# Patient Record
Sex: FEMALE | Race: OTHER | Employment: OTHER | URBAN - METROPOLITAN AREA
[De-identification: names, ages, dates, MRNs, and addresses within clinical notes are randomized per-mention and may not be internally consistent; named-entity substitution may affect disease eponyms.]

---

## 2019-01-29 ENCOUNTER — OFFICE VISIT (OUTPATIENT)
Dept: FAMILY MEDICINE CLINIC | Age: 39
End: 2019-01-29

## 2019-01-29 ENCOUNTER — HOSPITAL ENCOUNTER (OUTPATIENT)
Dept: LAB | Age: 39
Discharge: HOME | End: 2019-01-29

## 2019-01-29 VITALS
BODY MASS INDEX: 31.72 KG/M2 | DIASTOLIC BLOOD PRESSURE: 74 MMHG | TEMPERATURE: 98.6 F | WEIGHT: 168 LBS | SYSTOLIC BLOOD PRESSURE: 115 MMHG | HEIGHT: 61 IN | HEART RATE: 92 BPM

## 2019-01-29 DIAGNOSIS — G44.219 EPISODIC TENSION-TYPE HEADACHE, NOT INTRACTABLE: Primary | ICD-10-CM

## 2019-01-29 DIAGNOSIS — M79.89 BILATERAL HAND SWELLING: ICD-10-CM

## 2019-01-29 DIAGNOSIS — Z23 ENCOUNTER FOR IMMUNIZATION: ICD-10-CM

## 2019-01-29 DIAGNOSIS — R53.83 FATIGUE, UNSPECIFIED TYPE: ICD-10-CM

## 2019-01-29 DIAGNOSIS — M25.50 ARTHRALGIA, UNSPECIFIED JOINT: ICD-10-CM

## 2019-01-29 PROCEDURE — 85652 RBC SED RATE AUTOMATED: CPT

## 2019-01-29 PROCEDURE — 84439 ASSAY OF FREE THYROXINE: CPT

## 2019-01-29 PROCEDURE — 83036 HEMOGLOBIN GLYCOSYLATED A1C: CPT

## 2019-01-29 PROCEDURE — 84480 ASSAY TRIIODOTHYRONINE (T3): CPT

## 2019-01-29 PROCEDURE — 80053 COMPREHEN METABOLIC PANEL: CPT

## 2019-01-29 PROCEDURE — 84443 ASSAY THYROID STIM HORMONE: CPT

## 2019-01-29 PROCEDURE — 85025 COMPLETE CBC W/AUTO DIFF WBC: CPT

## 2019-01-29 NOTE — PROGRESS NOTES
Qing Gary  Requests flu vaccine. Denies fever and egg allergy. VIS information sheet given to patient. Explained possible s/e. Reviewed s/sx indicating need to be seen in ER. Patient had no adverse reaction at time of discharge. Entered into VIIS. GIVEN BY Enma MARTINEZ LPN.  Terrell Mercedes RN

## 2019-01-29 NOTE — PROGRESS NOTES
Hailey Blankenship is a 45 y.o. female    Issues discussed today include:    1) Headaches, eyes watering:    New daily headaches for 2-3 weeks now. Never before had headaches. No focal neurologic sxs, but can feel dizzy when severe. Eyes water off and on. Reports not sleeping well. Drinking lots of water. Also with multiple joint pains for > 10 yrs. Was dx'd with juvenile arthritis in [place] when she was about 31 yo. Went through tx then and got better. Now starting to having body and joint aches again. Swelling in the face occurs more in the morning, then improves throughout the day - only has had this 3 times in the last few weeks. Swelling in hands occurs in the afternoons and evenings, most days. Thinks her mat gpa had arthritis, unsure if rheumatoid or osteo. Sister with severe anemia, Hgb 6.0 and needed transfusions, remains in tx. Denies fever, chills, nasal congestion. Data reviewed or ordered today:       Other problems include: There is no problem list on file for this patient. Medications:  No current outpatient medications on file prior to visit. No current facility-administered medications on file prior to visit. Allergies:  No Known Allergies    LMP:  No LMP recorded. Patient has had a hysterectomy.     Social History     Socioeconomic History    Marital status: SINGLE     Spouse name: Not on file    Number of children: Not on file    Years of education: Not on file    Highest education level: Not on file   Social Needs    Financial resource strain: Not on file    Food insecurity - worry: Not on file    Food insecurity - inability: Not on file    Transportation needs - medical: Not on file   [employer] needs - non-medical: Not on file   Occupational History    Not on file   Tobacco Use    Smoking status: Never Smoker    Smokeless tobacco: Never Used   Substance and Sexual Activity    Alcohol use: No     Frequency: Never    Drug use: No    Sexual activity: Not on file   Other Topics Concern    Not on file   Social History Narrative    Not on file       No family history on file. Physical Exam   Visit Vitals  /74 (BP 1 Location: Left arm, BP Patient Position: Sitting)   Pulse 92   Temp 98.6 °F (37 °C) (Oral)   Ht 5' 0.63\" (1.54 m)   Wt 168 lb (76.2 kg)   BMI 32.13 kg/m²      BP Readings from Last 3 Encounters:   01/29/19 115/74     Constitutional: Appears well,  No acute distress, Vitals noted  Psychiatric:  Affect normal, Alert and Oriented to person/place/time  Eyes:  Conjunctiva clear, no drainage  ENT:  External ears and nose normal, Mucous membranes moist  Neck:  General inspection normal. Supple. Heart:  Normal HR, Normal S1 and S2,  Regular rhythm. No murmurs, rubs or gallops. Lungs:  Clear to auscultation, good respiratory effort, no wheezes, rales or rhonchi  Extremities: Without edema, good peripheral pulses  Skin:  Warm to palpation, without rashes  MSK: no joint efusion or tenderness on exam today, FROM [facility]  Neuro: CNII-XII intact, symmetric and intact sensation to light touch throughout, equal and full motor strength in all extremities, DTRs symmetric and normal      Assessment/Plan:      ICD-10-CM ICD-9-CM    1. Episodic tension-type headache, not intractable G44.219 339.11 CBC WITH AUTOMATED DIFF      METABOLIC PANEL, COMPREHENSIVE      HEMOGLOBIN A1C WITH EAG      SED RATE (ESR)      SED RATE (ESR)      HEMOGLOBIN A1C WITH EAG      METABOLIC PANEL, COMPREHENSIVE      CBC WITH AUTOMATED DIFF   2. Arthralgia, unspecified joint M25.50 719.40 CBC WITH AUTOMATED DIFF      SED RATE (ESR)      SED RATE (ESR)      CBC WITH AUTOMATED DIFF   3. Fatigue, unspecified type R53.83 780.79 TSH 3RD GENERATION      TSH 3RD GENERATION   4.  Bilateral hand swelling M79.89 729.81 TSH 3RD GENERATION      TSH 3RD GENERATION       Labs today to r/o etiology of her arthralgias, fatigue, headaches  Labs today - will call if abnormal or she can call in a week to know results  Nurse to give optometry resource page for eye exam  AVS with handout on tension headaches  Close follow up, will make sooner intervention if abnl labs      Follow-up Disposition:  Return in about 6 weeks (around 3/12/2019) for f/u appt.         Maia Bass MD  [address] [place] [address]

## 2019-01-29 NOTE — PATIENT INSTRUCTIONS
Dolor musculoesquelético: Instrucciones de cuidado - [ Musculoskeletal Pain: Care Instructions ]  Instrucciones de cuidado    Diferentes problemas con los [place], los músculos, los nervios, los ligamentos y los tendones del cuerpo pueden provocar dolor. Es posible que le duelan o ardan roger o más zonas del cuerpo. O pueden sentirse cansadas o rígidas. El término médico para alma tipo de dolor es dolor musculoesquelético. Puede tener muchas causas diferentes. A veces, el dolor está causado por roger lesión sneha roger distensión o un esguince. O usted puede tener dolor por juan r usado roger parte de blackwood cuerpo de la misma manera roger y [place]. Takilma se llama uso excesivo. En algunos casos, la causa del dolor es otro problema sneha la artritis o la fibromialgia. El médico lo Tiana Aliza y le hará preguntas acerca de blackwood philippe para ayudar a determinar la causa del dolor. Los análisis de [address] o las pruebas por imágenes, sneha roger radiografía (jossue X), también pueden ser [place]. Sin embargo, los médicos a veces no encuentran la causa del dolor. El tratamiento depende de maddi síntomas y de la causa del dolor, si es que se conoce. El médico lo morgan examinado minuciosamente, gwendolyn pueden presentarse problemas más tarde. Si nota algún problema o nuevos síntomas, busque tratamiento médico de inmediato. La atención de seguimiento es roger parte clave de blackwood tratamiento y seguridad. Asegúrese de hacer y acudir a todas las citas, y llame a blackwood médico si está teniendo problemas. También es roger buena idea saber los resultados de maddi exámenes y mantener roger lista de los medicamentos que belkis. ¿Cómo puede cuidarse en el hogar? · Descanse hasta que se sienta mejor. · No jose nada que empeore el dolor. Retome el ejercicio gradualmente si se siente mejor y blackwood médico dice que [address] [facility]. · Sea milly con los medicamentos. Paula y siga todas las instrucciones de la [place].   ? Si el médico le recetó un analgésico (medicamento para el dolor), tómelo según las indicaciones. ? Si no está tomando un analgésico recetado, pregúntele a blackwood médico si puede sahara kimmy de [employer]. · Colóquese hielo o roger compresa fría en la tyree por entre 10 y 21 minutos cada vez para aliviar el dolor. Póngase un paño fischer entre el hielo y la piel. ¿Cuándo debe pedir ayuda? Llame a blackwood médico ahora mismo o busque atención médica inmediata si:    · Siente un dolor nuevo o el dolor empeora.     · Tiene nuevos síntomas, sneha fiebre, escalofríos o un salpullido.    Preste especial atención a los cambios en blackwood philippe y asegúrese de comunicarse con blackwood médico si:    · No mejora sneha se esperaba. ¿Dónde puede encontrar más información en inglés? Jenise Pérez a http://peter-miguel.info/. Mabel Bray M974 en la búsqueda para aprender más acerca de \"Dolor musculoesquelético: Instrucciones de cuidado - [ Musculoskeletal Pain: Care Instructions ]. \"  Revisado: 3 noé, 2018  Versión del contenido: 11.9  © 3493-8385 Healthwise, Incorporated. Las instrucciones de cuidado fueron adaptadas bajo licencia por [employer] (which disclaims liability or warranty for this information). Si usted tiene [place] afección médica o sobre estas instrucciones, siempre pregunte a blackwood profesional de philippe. Healthwise, Incorporated niega toda garantía o responsabilidad por blackwood uso de esta información. Dolor de arnie: Instrucciones de cuidado - [ Headache: Care Instructions ]  Instrucciones de cuidado    Los ana de arnie tienen muchas causas posibles. La mayoría de los ana de arnie no son señal de un problema más cruz y mejoran por sí solos. El tratamiento en el hogar podría ayudarlo a sentirse mejor con Clementina Smiani. El médico lo morgan revisado minuciosamente, gwendolyn puede desarrollar problemas más tarde. Si nota algún problema o síntomas, busque tratamiento médico inmediatamente.   Gian Furl de seguimiento es roger parte clave de blackwood tratamiento y seguridad. Asegúrese de hacer y acudir a todas las citas, y llame a blackwood médico si está teniendo problemas. También es roger buena idea saber los resultados de maddi exámenes y mantener roger lista de los medicamentos que belkis. ¿Cómo puede cuidarse en el hogar? · No conduzca si ha tomado analgésicos (medicamentos para el dolor) recetados. · Descanse en un cuarto tranquilo y oscuro hasta que desaparezca el dolor de [place]. Cierre los ojos y trate de relajarse o dormirse. No johnson la televisión ni umm. · Colóquese un paño frío y húmedo o [place] compresa fría sobre la tyree adolorida de 10 a 21 minutos cada vez. Póngase un paño fischer entre la compresa fría y la piel. · Utilice roger toalla húmeda tibia o roger almohadilla térmica ajustada a [place] temperatura para relajar los músculos tensos del sharyn y los hombros.  · [place] a alguien que le jose masajes suaves en el sharyn y los hombros.  · [place] los analgésicos exactamente sneha le fueron indicados. ? Si el médico le recetó un analgésico, tómelo según las indicaciones. ? Si no está tomando un analgésico recetado, pregúntele a blackwood médico si puede sahara kimmy de [employer]. · Tenga cuidado de no sahara analgésicos con mayor frecuencia que la permitida en las indicaciones porque los ana de arnie podrían empeorar o aparecer con mayor frecuencia roger vez que el medicamento pierda blackwood [place]. · Preste atención a los nuevos síntomas que aparecen con el dolor de [place], [place], debilidad o entumecimiento, cambios en la visión o confusión. Podrían ser señales de un problema más grave. Para prevenir los ana de arnie  · [employer] un diario de maddi ana de arnie para que pueda averiguar qué los desencadena. Evitar los desencadenantes podría ayudar a prevenir los ana de [place]. Anote cuándo empieza cada dolor de [place], cuánto dura y cómo es el dolor (palpitante, debbie, punzante o sordo).  Anote cualquier otro síntoma que haya tenido con el dolor de [place], [place], destellos de jake o [employer], o sensibilidad a la jake brillante o a los ruidos ciro. Anote si el dolor de arnie ocurrió cerca de blackwood menstruación. Enumere todos los factores que pudieran juan r desencadenado el dolor de [place], sneha ciertos alimentos (chocolate, queso, vino) u olores, humo, luces brillantes, estrés o falta de sueño. · Encuentre maneras saludables de [facility]. Los ana de [place] son más comunes titi o jese después de un momento estresante. Tómese un tiempo para relajarse antes y después de hacer algo que le haya causado un dolor de arnie en el pasado. · Trate de mantener dori músculos relajados mediante roger buena postura. Revise si tiene [employer] de la [place], la dana, el sharyn y los hombros y trate de relajarlos. Cuando se siente en un escritorio, cambie de posición con frecuencia y estírese por 27 segundos cada hora. · Gwen suficiente ejercicio y duerma bastante. · Coma en forma regular y abimael. Largos períodos sin comer pueden provocar un dolor de arnie. · Regálese un masaje. Algunas personas encuentran que los masajes hechos con regularidad son [place] para aliviar la tensión. · Limite la cafeína. No claudio demasiado café, té ni sodas. Jairon no deje de consumir cafeína de repente, porque eso también puede provocarle ana de [place]. · Reduzca la tensión en los ojos a causa de la computadora parpadeando con frecuencia y apartando la mirada de la pantalla a menudo. Asegúrese de tener lentes adecuados y de que blackwood monitor esté colocado de manera correcta, sneha a un brazo de distancia. · Busque ayuda si tiene depresión o ansiedad. Dori ana de [place] podrían relacionarse con estas afecciones. El tratamiento puede prevenir los ana de [place] y ayudar con los síntomas de ansiedad o depresión. ¿Cuándo debe pedir ayuda? Llame al 911 en cualquier momento que piense que puede necesitar atención de emergencia.  Por ejemplo, llame si:    · Yana Clements señales de un ataque cerebral. Estas pueden incluir:  ? Parálisis, entumecimiento o debilidad repentinos en la dana, el brazo o la pierna, sobre todo si ocurre en un solo lado del cuerpo. ? Cambios repentinos en la visión. ? Dificultades repentinas para hablar. ? Confusión repentina o dificultad para comprender frases sencillas. ? Problemas repentinos para caminar o mantener el equilibrio. ? Dolor de [place] intenso y repentino, distinto de los ana de arnie anteriores.    Llame a blackwood médico ahora mismo o busque atención médica inmediata si:    · Tiene un dolor de Yamel Bryan o peor.     · Blackwood dolor de arnie empeora mucho. ¿Dónde puede encontrar más información en inglés? Ashtyn Andrade a http://peter-miguel.info/. Escriba W576 en la búsqueda para aprender más acerca de \"Dolor de arnie: Instrucciones de cuidado - [ Headache: Care Instructions ]. \"  Revisado: 3 noé, 2018  Versión del contenido: 11.9  © 6374-7290 Healthwise, Incorporated. Las instrucciones de cuidado fueron adaptadas bajo licencia por [employer] (which disclaims liability or warranty for this information). Si usted tiene [place] afección médica o sobre estas instrucciones, siempre pregunte a blackwood profesional de philippe. Healthwise, Incorporated niega toda garantía o responsabilidad por blackwood uso de esta información.

## 2019-01-30 LAB
ALBUMIN SERPL-MCNC: 4.3 G/DL (ref 3.5–5)
ALBUMIN/GLOB SERPL: 1.1 {RATIO} (ref 1.1–2.2)
ALP SERPL-CCNC: 87 U/L (ref 45–117)
ALT SERPL-CCNC: 25 U/L (ref 12–78)
ANION GAP SERPL CALC-SCNC: 8 MMOL/L (ref 5–15)
AST SERPL-CCNC: 28 U/L (ref 15–37)
BASOPHILS # BLD: 0.1 K/UL (ref 0–0.1)
BASOPHILS NFR BLD: 1 % (ref 0–1)
BILIRUB SERPL-MCNC: 0.4 MG/DL (ref 0.2–1)
BUN SERPL-MCNC: 10 MG/DL (ref 6–20)
BUN/CREAT SERPL: 9 (ref 12–20)
CALCIUM SERPL-MCNC: 8.8 MG/DL (ref 8.5–10.1)
CHLORIDE SERPL-SCNC: 101 MMOL/L (ref 97–108)
CO2 SERPL-SCNC: 29 MMOL/L (ref 21–32)
CREAT SERPL-MCNC: 1.09 MG/DL (ref 0.55–1.02)
DIFFERENTIAL METHOD BLD: ABNORMAL
EOSINOPHIL # BLD: 0.2 K/UL (ref 0–0.4)
EOSINOPHIL NFR BLD: 2 % (ref 0–7)
ERYTHROCYTE [DISTWIDTH] IN BLOOD BY AUTOMATED COUNT: 13.9 % (ref 11.5–14.5)
ERYTHROCYTE [SEDIMENTATION RATE] IN BLOOD: 18 MM/HR (ref 0–20)
EST. AVERAGE GLUCOSE BLD GHB EST-MCNC: 111 MG/DL
GLOBULIN SER CALC-MCNC: 4 G/DL (ref 2–4)
GLUCOSE SERPL-MCNC: 83 MG/DL (ref 65–100)
HBA1C MFR BLD: 5.5 % (ref 4.2–6.3)
HCT VFR BLD AUTO: 38.4 % (ref 35–47)
HGB BLD-MCNC: 12.1 G/DL (ref 11.5–16)
IMM GRANULOCYTES # BLD AUTO: 0 K/UL (ref 0–0.04)
IMM GRANULOCYTES NFR BLD AUTO: 0 % (ref 0–0.5)
LYMPHOCYTES # BLD: 2.6 K/UL (ref 0.8–3.5)
LYMPHOCYTES NFR BLD: 36 % (ref 12–49)
MCH RBC QN AUTO: 29.5 PG (ref 26–34)
MCHC RBC AUTO-ENTMCNC: 31.5 G/DL (ref 30–36.5)
MCV RBC AUTO: 93.7 FL (ref 80–99)
MONOCYTES # BLD: 0.3 K/UL (ref 0–1)
MONOCYTES NFR BLD: 4 % (ref 5–13)
NEUTS SEG # BLD: 4.2 K/UL (ref 1.8–8)
NEUTS SEG NFR BLD: 57 % (ref 32–75)
NRBC # BLD: 0 K/UL (ref 0–0.01)
NRBC BLD-RTO: 0 PER 100 WBC
PLATELET # BLD AUTO: 250 K/UL (ref 150–400)
PMV BLD AUTO: 11.7 FL (ref 8.9–12.9)
POTASSIUM SERPL-SCNC: 4.8 MMOL/L (ref 3.5–5.1)
PROT SERPL-MCNC: 8.3 G/DL (ref 6.4–8.2)
RBC # BLD AUTO: 4.1 M/UL (ref 3.8–5.2)
SODIUM SERPL-SCNC: 138 MMOL/L (ref 136–145)
T4 FREE SERPL-MCNC: 0.3 NG/DL (ref 0.8–1.5)
TSH SERPL DL<=0.05 MIU/L-ACNC: >100 UIU/ML (ref 0.36–3.74)
WBC # BLD AUTO: 7.4 K/UL (ref 3.6–11)

## 2019-01-30 NOTE — PROGRESS NOTES
TSH extremely high c/w hypothyroidism, which could explain her sxs   ** I will be recommending tx, but I have requested add on labs of free T4 and total T3 be run also    CMP with only mild Cr elevation, will monitor with thyroid studies     CBC, ESR and A1c wnl

## 2019-01-31 DIAGNOSIS — E03.9 HYPOTHYROIDISM, UNSPECIFIED TYPE: Primary | ICD-10-CM

## 2019-01-31 LAB — T3 SERPL-MCNC: 69 NG/DL (ref 71–180)

## 2019-01-31 RX ORDER — LEVOTHYROXINE SODIUM 100 UG/1
100 TABLET ORAL
Qty: 90 TAB | Refills: 1 | Status: SHIPPED | OUTPATIENT
Start: 2019-01-31

## 2019-01-31 NOTE — PROGRESS NOTES
Patient with new dx of hypothyroidism, TSH > 100 and low fT4 and tT3  - Start levothyroxine 100 mcg daily 30 mins before breakfast and not with any other meds (rx sent to [facility] - $24 with goodrx coupon needed; could send to [facility] of pt's choice for $10 without coupon necessary)  - Recommend recheck TSH in 6 weeks (lab ordered)    Other labs (CBC, CMP, ESR, A1c essentially normal)

## 2019-02-01 NOTE — PROGRESS NOTES
I called the pt, no answer - left vm asking her to call us back at [facility] to discuss results and tx

## 2019-02-04 NOTE — PROGRESS NOTES
I attempted to call pt again and no answer, left  requesting she call us back soon to discuss results, provided main # to [facility]

## 2019-02-08 NOTE — PROGRESS NOTES
I called emergency contact #, but was not the person listed and denied knowing emergency contact's name.

## 2019-02-22 NOTE — PROGRESS NOTES
Telephoned patient to home/cell, no answer; left generic message to call our [facility] main office. Nurse also telephoned emergency contact no answer left message to reach our office back, a letter was also created and send to address on file to visit one of our clinic of call our office to go over lab results.  Maria Doshi RN

## 2019-02-22 NOTE — PROGRESS NOTES
Appreciate the efforts to reach this pt with important result  I too tried calling her once more on mobile and left vm expressing how important it was for her to call our office (# given) to discuss her results  Hopefully she will contact us soon with multiple phone messages and letter mailed today

## 2020-02-18 ENCOUNTER — HOSPITAL ENCOUNTER (OUTPATIENT)
Dept: LAB | Age: 40
Discharge: HOME | End: 2020-02-18

## 2020-02-18 LAB
CHOLEST SERPL-MCNC: 207 MG/DL
HDLC SERPL-MCNC: 41 MG/DL
HDLC SERPL: 5 {RATIO} (ref 0–5)
LDLC SERPL CALC-MCNC: 110.6 MG/DL (ref 0–100)
LIPID PROFILE,FLP: ABNORMAL
TRIGL SERPL-MCNC: 277 MG/DL (ref ?–150)
VLDLC SERPL CALC-MCNC: 55.4 MG/DL

## 2020-02-18 PROCEDURE — 80061 LIPID PANEL: CPT

## 2023-05-03 ENCOUNTER — HOSPITAL ENCOUNTER (OUTPATIENT)
Dept: LAB | Age: 43
Discharge: HOME | End: 2023-05-03

## 2023-05-03 PROCEDURE — 36415 COLL VENOUS BLD VENIPUNCTURE: CPT

## 2023-05-03 PROCEDURE — 85025 COMPLETE CBC W/AUTO DIFF WBC: CPT

## 2023-05-03 PROCEDURE — 84443 ASSAY THYROID STIM HORMONE: CPT

## 2023-05-03 PROCEDURE — 80061 LIPID PANEL: CPT

## 2023-05-03 PROCEDURE — 80053 COMPREHEN METABOLIC PANEL: CPT

## 2023-05-03 PROCEDURE — 83036 HEMOGLOBIN GLYCOSYLATED A1C: CPT

## 2023-05-04 LAB
ALBUMIN SERPL-MCNC: 4.1 G/DL (ref 3.5–5)
ALBUMIN/GLOB SERPL: 1.1 (ref 1.1–2.2)
ALP SERPL-CCNC: 81 U/L (ref 45–117)
ALT SERPL-CCNC: 22 U/L (ref 12–78)
ANION GAP SERPL CALC-SCNC: 2 MMOL/L (ref 5–15)
AST SERPL-CCNC: 18 U/L (ref 15–37)
BASOPHILS # BLD: 0.1 K/UL (ref 0–0.1)
BASOPHILS NFR BLD: 1 % (ref 0–1)
BILIRUB SERPL-MCNC: 0.2 MG/DL (ref 0.2–1)
BUN SERPL-MCNC: 13 MG/DL (ref 6–20)
BUN/CREAT SERPL: 18 (ref 12–20)
CALCIUM SERPL-MCNC: 9.2 MG/DL (ref 8.5–10.1)
CHLORIDE SERPL-SCNC: 103 MMOL/L (ref 97–108)
CHOLEST SERPL-MCNC: 207 MG/DL
CO2 SERPL-SCNC: 28 MMOL/L (ref 21–32)
CREAT SERPL-MCNC: 0.73 MG/DL (ref 0.55–1.02)
DIFFERENTIAL METHOD BLD: ABNORMAL
EOSINOPHIL # BLD: 0.1 K/UL (ref 0–0.4)
EOSINOPHIL NFR BLD: 1 % (ref 0–7)
ERYTHROCYTE [DISTWIDTH] IN BLOOD BY AUTOMATED COUNT: 13.6 % (ref 11.5–14.5)
EST. AVERAGE GLUCOSE BLD GHB EST-MCNC: 114 MG/DL
GLOBULIN SER CALC-MCNC: 3.6 G/DL (ref 2–4)
GLUCOSE SERPL-MCNC: 102 MG/DL (ref 65–100)
HBA1C MFR BLD: 5.6 % (ref 4–5.6)
HCT VFR BLD AUTO: 38.8 % (ref 35–47)
HDLC SERPL-MCNC: 54 MG/DL
HDLC SERPL: 3.8 (ref 0–5)
HGB BLD-MCNC: 12.3 G/DL (ref 11.5–16)
IMM GRANULOCYTES # BLD AUTO: 0.1 K/UL (ref 0–0.04)
IMM GRANULOCYTES NFR BLD AUTO: 1 % (ref 0–0.5)
LDLC SERPL CALC-MCNC: 137 MG/DL (ref 0–100)
LYMPHOCYTES # BLD: 2 K/UL (ref 0.8–3.5)
LYMPHOCYTES NFR BLD: 31 % (ref 12–49)
MCH RBC QN AUTO: 30.4 PG (ref 26–34)
MCHC RBC AUTO-ENTMCNC: 31.7 G/DL (ref 30–36.5)
MCV RBC AUTO: 95.8 FL (ref 80–99)
MONOCYTES # BLD: 0.3 K/UL (ref 0–1)
MONOCYTES NFR BLD: 5 % (ref 5–13)
NEUTS SEG # BLD: 3.8 K/UL (ref 1.8–8)
NEUTS SEG NFR BLD: 61 % (ref 32–75)
NRBC # BLD: 0 K/UL (ref 0–0.01)
NRBC BLD-RTO: 0 PER 100 WBC
PLATELET # BLD AUTO: 246 K/UL (ref 150–400)
PMV BLD AUTO: 11.2 FL (ref 8.9–12.9)
POTASSIUM SERPL-SCNC: 3.9 MMOL/L (ref 3.5–5.1)
PROT SERPL-MCNC: 7.7 G/DL (ref 6.4–8.2)
RBC # BLD AUTO: 4.05 M/UL (ref 3.8–5.2)
SODIUM SERPL-SCNC: 133 MMOL/L (ref 136–145)
TRIGL SERPL-MCNC: 80 MG/DL (ref ?–150)
TSH SERPL DL<=0.05 MIU/L-ACNC: >100 UIU/ML (ref 0.36–3.74)
VLDLC SERPL CALC-MCNC: 16 MG/DL
WBC # BLD AUTO: 6.3 K/UL (ref 3.6–11)

## 2023-05-08 ENCOUNTER — HOSPITAL ENCOUNTER (OUTPATIENT)
Facility: HOSPITAL | Age: 43
Setting detail: SPECIMEN
Discharge: HOME | End: 2023-05-11

## 2023-05-08 LAB
BASOPHILS # BLD: 0.1 K/UL (ref 0–0.1)
BASOPHILS NFR BLD: 1 % (ref 0–1)
DIFFERENTIAL METHOD BLD: ABNORMAL
EOSINOPHIL # BLD: 0.2 K/UL (ref 0–0.4)
EOSINOPHIL NFR BLD: 2 % (ref 0–7)
ERYTHROCYTE [DISTWIDTH] IN BLOOD BY AUTOMATED COUNT: 13 % (ref 11.5–14.5)
HCT VFR BLD AUTO: 37.8 % (ref 35–47)
HGB BLD-MCNC: 12.2 G/DL (ref 11.5–16)
IMM GRANULOCYTES # BLD AUTO: 0.1 K/UL (ref 0–0.04)
IMM GRANULOCYTES NFR BLD AUTO: 1 % (ref 0–0.5)
LYMPHOCYTES # BLD: 3.2 K/UL (ref 0.8–3.5)
LYMPHOCYTES NFR BLD: 29 % (ref 12–49)
MCH RBC QN AUTO: 30.1 PG (ref 26–34)
MCHC RBC AUTO-ENTMCNC: 32.3 G/DL (ref 30–36.5)
MCV RBC AUTO: 93.3 FL (ref 80–99)
MONOCYTES # BLD: 0.5 K/UL (ref 0–1)
MONOCYTES NFR BLD: 5 % (ref 5–13)
NEUTS SEG # BLD: 7 K/UL (ref 1.8–8)
NEUTS SEG NFR BLD: 62 % (ref 32–75)
NRBC # BLD: 0 K/UL (ref 0–0.01)
NRBC BLD-RTO: 0 PER 100 WBC
PLATELET # BLD AUTO: 254 K/UL (ref 150–400)
PMV BLD AUTO: 10.8 FL (ref 8.9–12.9)
RBC # BLD AUTO: 4.05 M/UL (ref 3.8–5.2)
T4 FREE SERPL-MCNC: 0.3 NG/DL (ref 0.8–1.5)
TSH SERPL DL<=0.05 MIU/L-ACNC: >100 UIU/ML (ref 0.36–3.74)
WBC # BLD AUTO: 11 K/UL (ref 3.6–11)

## 2023-05-08 PROCEDURE — 84443 ASSAY THYROID STIM HORMONE: CPT

## 2023-05-08 PROCEDURE — 85025 COMPLETE CBC W/AUTO DIFF WBC: CPT

## 2023-05-08 PROCEDURE — 84439 ASSAY OF FREE THYROXINE: CPT

## 2023-05-22 RX ORDER — LEVOTHYROXINE SODIUM 0.1 MG/1
100 TABLET ORAL
COMMUNITY
Start: 2019-01-31

## 2023-07-06 ENCOUNTER — HOSPITAL ENCOUNTER (OUTPATIENT)
Facility: HOSPITAL | Age: 43
Setting detail: SPECIMEN
Discharge: HOME | End: 2023-07-09

## 2023-07-06 PROCEDURE — 36415 COLL VENOUS BLD VENIPUNCTURE: CPT

## 2023-07-06 PROCEDURE — 84443 ASSAY THYROID STIM HORMONE: CPT

## 2023-07-07 LAB — TSH SERPL DL<=0.05 MIU/L-ACNC: 6.81 UIU/ML (ref 0.36–3.74)

## 2023-10-16 ENCOUNTER — HOSPITAL ENCOUNTER (OUTPATIENT)
Facility: HOSPITAL | Age: 43
Setting detail: SPECIMEN
Discharge: HOME | End: 2023-10-19

## 2023-10-16 PROCEDURE — 36415 COLL VENOUS BLD VENIPUNCTURE: CPT

## 2023-10-16 PROCEDURE — 84443 ASSAY THYROID STIM HORMONE: CPT

## 2023-10-17 LAB — TSH SERPL DL<=0.05 MIU/L-ACNC: 2.24 UIU/ML (ref 0.36–3.74)

## 2024-02-23 ENCOUNTER — TELEPHONE (OUTPATIENT)
Age: 44
End: 2024-02-23

## 2024-02-23 NOTE — TELEPHONE ENCOUNTER
The [facility]  was used. Calling pt about referral from [facility] for breast pain. No answer but able to leave a vm.JENNIFER COVARRUBIAS, RN

## 2024-02-28 NOTE — TELEPHONE ENCOUNTER
The  services used. Calling pt about referral from [facility] for right breast pain. Pt states she has had right breast pain for past 3 weeks. First 2 weeks it was constant and this last week has been off and on. No pain with palpitation. Pt denies any nipple discharge, no skin changes and no breast lumps. Pt offered first available screening mammogram appt, pt ok with appt date, time and location. Pt states last mammogram done 8/2022, it was done at a hospital downtown but not [facility]. Pt believes it was done at [facility]. Patient's eligibility for the [facility] program was assessed today- per patient's income, household # and lack of health/medical insurance, pt is eligible for EWL program. Demographic information up to date in epic.

## 2024-03-01 NOTE — TELEPHONE ENCOUNTER
The  services used. Need to verify with pt is imaging at [place](?) was 8/2022 or 8/2023. JENNIFER COVARRUBIAS RN

## 2024-03-05 NOTE — TELEPHONE ENCOUNTER
The  services used. Need to verify with pt is imaging at [place](?) was 8/2022 or 8/2023 JENNIFER COVARRUBIAS RN

## 2024-03-13 NOTE — TELEPHONE ENCOUNTER
The  services used. Need to verify with pt is imaging at [place](?) was 8/2022 or 8/2023  Called but no answer, left a vm.JENNIFER COVARRUBIAS RN

## 2024-03-26 ENCOUNTER — HOSPITAL ENCOUNTER (INPATIENT)
Facility: HOSPITAL | Age: 44
LOS: 1 days | Discharge: HOME | DRG: 916 | End: 2024-03-27
Attending: STUDENT IN AN ORGANIZED HEALTH CARE EDUCATION/TRAINING PROGRAM | Admitting: INTERNAL MEDICINE

## 2024-03-26 DIAGNOSIS — T78.2XXA ANAPHYLAXIS, INITIAL ENCOUNTER: Primary | ICD-10-CM

## 2024-03-26 PROBLEM — T78.3XXA ANGIOEDEMA, INITIAL ENCOUNTER: Status: RESOLVED | Noted: 2024-03-26 | Resolved: 2024-03-26

## 2024-03-26 PROBLEM — T78.3XXA ANGIOEDEMA, INITIAL ENCOUNTER: Status: ACTIVE | Noted: 2024-03-26

## 2024-03-26 LAB

## 2024-03-26 PROCEDURE — 2500000003 HC RX 250 WO HCPCS: Performed by: STUDENT IN AN ORGANIZED HEALTH CARE EDUCATION/TRAINING PROGRAM

## 2024-03-26 PROCEDURE — 6360000002 HC RX W HCPCS: Performed by: INTERNAL MEDICINE

## 2024-03-26 PROCEDURE — A4216 STERILE WATER/SALINE, 10 ML: HCPCS | Performed by: INTERNAL MEDICINE

## 2024-03-26 PROCEDURE — 2580000003 HC RX 258: Performed by: STUDENT IN AN ORGANIZED HEALTH CARE EDUCATION/TRAINING PROGRAM

## 2024-03-26 PROCEDURE — 2060000000 HC ICU INTERMEDIATE R&B

## 2024-03-26 PROCEDURE — 96374 THER/PROPH/DIAG INJ IV PUSH: CPT

## 2024-03-26 PROCEDURE — 99285 EMERGENCY DEPT VISIT HI MDM: CPT

## 2024-03-26 PROCEDURE — 2500000003 HC RX 250 WO HCPCS: Performed by: INTERNAL MEDICINE

## 2024-03-26 PROCEDURE — A4216 STERILE WATER/SALINE, 10 ML: HCPCS | Performed by: STUDENT IN AN ORGANIZED HEALTH CARE EDUCATION/TRAINING PROGRAM

## 2024-03-26 PROCEDURE — 2580000003 HC RX 258: Performed by: INTERNAL MEDICINE

## 2024-03-26 PROCEDURE — 0202U NFCT DS 22 TRGT SARS-COV-2: CPT

## 2024-03-26 PROCEDURE — 3E033XZ INTRODUCTION OF VASOPRESSOR INTO PERIPHERAL VEIN, PERCUTANEOUS APPROACH: ICD-10-PCS | Performed by: INTERNAL MEDICINE

## 2024-03-26 PROCEDURE — 96372 THER/PROPH/DIAG INJ SC/IM: CPT

## 2024-03-26 PROCEDURE — 6360000002 HC RX W HCPCS: Performed by: STUDENT IN AN ORGANIZED HEALTH CARE EDUCATION/TRAINING PROGRAM

## 2024-03-26 PROCEDURE — 96375 TX/PRO/DX INJ NEW DRUG ADDON: CPT

## 2024-03-26 RX ORDER — POLYETHYLENE GLYCOL 3350 17 G/17G
17 POWDER, FOR SOLUTION ORAL DAILY PRN
Status: DISCONTINUED | OUTPATIENT
Start: 2024-03-26 | End: 2024-03-27 | Stop reason: HOSPADM

## 2024-03-26 RX ORDER — POTASSIUM CHLORIDE 29.8 MG/ML
20 INJECTION INTRAVENOUS PRN
Status: DISCONTINUED | OUTPATIENT
Start: 2024-03-26 | End: 2024-03-26

## 2024-03-26 RX ORDER — DIPHENHYDRAMINE HYDROCHLORIDE 50 MG/ML
25 INJECTION INTRAMUSCULAR; INTRAVENOUS EVERY 12 HOURS
Status: DISCONTINUED | OUTPATIENT
Start: 2024-03-26 | End: 2024-03-27 | Stop reason: HOSPADM

## 2024-03-26 RX ORDER — SODIUM CHLORIDE 9 MG/ML
INJECTION, SOLUTION INTRAVENOUS PRN
Status: DISCONTINUED | OUTPATIENT
Start: 2024-03-26 | End: 2024-03-27 | Stop reason: HOSPADM

## 2024-03-26 RX ORDER — ACETAMINOPHEN 325 MG/1
650 TABLET ORAL EVERY 6 HOURS PRN
Status: DISCONTINUED | OUTPATIENT
Start: 2024-03-26 | End: 2024-03-27 | Stop reason: HOSPADM

## 2024-03-26 RX ORDER — ONDANSETRON 2 MG/ML
4 INJECTION INTRAMUSCULAR; INTRAVENOUS EVERY 6 HOURS PRN
Status: DISCONTINUED | OUTPATIENT
Start: 2024-03-26 | End: 2024-03-27 | Stop reason: HOSPADM

## 2024-03-26 RX ORDER — EPINEPHRINE 1 MG/ML
0.3 INJECTION, SOLUTION, CONCENTRATE INTRAVENOUS ONCE
Status: COMPLETED | OUTPATIENT
Start: 2024-03-26 | End: 2024-03-26

## 2024-03-26 RX ORDER — ONDANSETRON 4 MG/1
4 TABLET, ORALLY DISINTEGRATING ORAL EVERY 8 HOURS PRN
Status: DISCONTINUED | OUTPATIENT
Start: 2024-03-26 | End: 2024-03-27 | Stop reason: HOSPADM

## 2024-03-26 RX ORDER — MAGNESIUM SULFATE IN WATER 40 MG/ML
2000 INJECTION, SOLUTION INTRAVENOUS PRN
Status: DISCONTINUED | OUTPATIENT
Start: 2024-03-26 | End: 2024-03-27 | Stop reason: HOSPADM

## 2024-03-26 RX ORDER — DIPHENHYDRAMINE HYDROCHLORIDE 50 MG/ML
50 INJECTION INTRAMUSCULAR; INTRAVENOUS
Status: COMPLETED | OUTPATIENT
Start: 2024-03-26 | End: 2024-03-26

## 2024-03-26 RX ORDER — SODIUM CHLORIDE 0.9 % (FLUSH) 0.9 %
5-40 SYRINGE (ML) INJECTION PRN
Status: DISCONTINUED | OUTPATIENT
Start: 2024-03-26 | End: 2024-03-27 | Stop reason: HOSPADM

## 2024-03-26 RX ORDER — SODIUM CHLORIDE 0.9 % (FLUSH) 0.9 %
5-40 SYRINGE (ML) INJECTION EVERY 12 HOURS SCHEDULED
Status: DISCONTINUED | OUTPATIENT
Start: 2024-03-26 | End: 2024-03-27 | Stop reason: HOSPADM

## 2024-03-26 RX ORDER — POTASSIUM CHLORIDE 7.45 MG/ML
10 INJECTION INTRAVENOUS PRN
Status: DISCONTINUED | OUTPATIENT
Start: 2024-03-26 | End: 2024-03-27 | Stop reason: HOSPADM

## 2024-03-26 RX ORDER — ENOXAPARIN SODIUM 100 MG/ML
40 INJECTION SUBCUTANEOUS DAILY
Status: DISCONTINUED | OUTPATIENT
Start: 2024-03-26 | End: 2024-03-27 | Stop reason: HOSPADM

## 2024-03-26 RX ORDER — ACETAMINOPHEN 650 MG/1
650 SUPPOSITORY RECTAL EVERY 6 HOURS PRN
Status: DISCONTINUED | OUTPATIENT
Start: 2024-03-26 | End: 2024-03-27 | Stop reason: HOSPADM

## 2024-03-26 RX ORDER — EPINEPHRINE 0.3 MG/.3ML
0.3 INJECTION SUBCUTANEOUS ONCE
Qty: 0.3 ML | Refills: 0 | Status: SHIPPED | OUTPATIENT
Start: 2024-03-26 | End: 2024-03-26

## 2024-03-26 RX ORDER — SODIUM CHLORIDE, SODIUM LACTATE, POTASSIUM CHLORIDE, AND CALCIUM CHLORIDE .6; .31; .03; .02 G/100ML; G/100ML; G/100ML; G/100ML
1000 INJECTION, SOLUTION INTRAVENOUS ONCE
Status: COMPLETED | OUTPATIENT
Start: 2024-03-26 | End: 2024-03-26

## 2024-03-26 RX ORDER — 0.9 % SODIUM CHLORIDE 0.9 %
1000 INTRAVENOUS SOLUTION INTRAVENOUS ONCE
Status: COMPLETED | OUTPATIENT
Start: 2024-03-26 | End: 2024-03-26

## 2024-03-26 RX ADMIN — DIPHENHYDRAMINE HYDROCHLORIDE 25 MG: 50 INJECTION, SOLUTION INTRAMUSCULAR; INTRAVENOUS at 18:41

## 2024-03-26 RX ADMIN — FAMOTIDINE 20 MG: 10 INJECTION, SOLUTION INTRAVENOUS at 06:11

## 2024-03-26 RX ADMIN — SODIUM CHLORIDE, PRESERVATIVE FREE 10 ML: 5 INJECTION INTRAVENOUS at 08:47

## 2024-03-26 RX ADMIN — EPINEPHRINE 0.3 MG: 1 INJECTION, SOLUTION, CONCENTRATE INTRAVENOUS at 06:03

## 2024-03-26 RX ADMIN — SODIUM CHLORIDE 1000 ML: 9 INJECTION, SOLUTION INTRAVENOUS at 08:44

## 2024-03-26 RX ADMIN — DIPHENHYDRAMINE HYDROCHLORIDE 50 MG: 50 INJECTION, SOLUTION INTRAMUSCULAR; INTRAVENOUS at 06:13

## 2024-03-26 RX ADMIN — ENOXAPARIN SODIUM 40 MG: 100 INJECTION SUBCUTANEOUS at 08:45

## 2024-03-26 RX ADMIN — METHYLPREDNISOLONE SODIUM SUCCINATE 125 MG: 125 INJECTION INTRAMUSCULAR; INTRAVENOUS at 06:17

## 2024-03-26 RX ADMIN — FAMOTIDINE 20 MG: 10 INJECTION, SOLUTION INTRAVENOUS at 19:20

## 2024-03-26 RX ADMIN — METHYLPREDNISOLONE SODIUM SUCCINATE 40 MG: 40 INJECTION INTRAMUSCULAR; INTRAVENOUS at 14:31

## 2024-03-26 RX ADMIN — SODIUM CHLORIDE, POTASSIUM CHLORIDE, SODIUM LACTATE AND CALCIUM CHLORIDE 1000 ML: 600; 310; 30; 20 INJECTION, SOLUTION INTRAVENOUS at 06:19

## 2024-03-26 RX ADMIN — SODIUM CHLORIDE, PRESERVATIVE FREE 10 ML: 5 INJECTION INTRAVENOUS at 19:22

## 2024-03-26 ASSESSMENT — PAIN SCALES - GENERAL: PAINLEVEL_OUTOF10: 0

## 2024-03-26 ASSESSMENT — PAIN - FUNCTIONAL ASSESSMENT: PAIN_FUNCTIONAL_ASSESSMENT: 0-10

## 2024-03-26 NOTE — ED PROVIDER NOTES
Epinephrine (which would likely be third dose this morning). Patient signed out to Dr. Pierson with observation until at least 8:30 am for recurrent symptoms to determine disposition.  [LT]      ED Course User Index  [LT] Raven Murcia MD           CONSULTS:  None    PROCEDURES:  Unless otherwise noted below, none     Procedures      FINAL IMPRESSION      1. Anaphylaxis, initial encounter          DISPOSITION/PLAN   DISPOSITION      Patient signed out to Dr. Pierson with observation until at least 8:30 am for recurrent symptoms to determine disposition.     (Please note that portions of this note were completed with a voice recognition program.  Efforts were made to edit the dictations but occasionally words are mis-transcribed.)    Raven Murcia MD (electronically signed)  Emergency Attending Physician       Raven Murcia MD  03/26/24 0749

## 2024-03-26 NOTE — H&P
[facility]  [address] [place], [place] [id number] [phone]    Admission History and Physical      NAME:  Diane Lacey   :   1980   MRN:  [id number]     PCP:  Brigette Perez MD     Date/Time of service:  3/26/2024  1:14 PM        Subjective:     CHIEF COMPLAINT: Anaphylaxis    HISTORY OF PRESENT ILLNESS:     Ms. Arnoldo Lacey is a 43 y.o.  female hyperlipidemia and hypothyroidism who is admitted with anaphylaxis.  Ms. Arnoldo Lacey states that she was experiencing bodyaches yesterday at which time she took Advil.  Later on in the evening she took melatonin; she had never taken melatonin prior.  Then she developed throat swelling, dyspnea, nausea and presented to [facility] emergency room; she stated that she received an injection there with improvement.  However, she later developed a diffuse body rash and tongue swelling when she arrived home from the hospital.  Therefore, she presented to [facility] emergency room where she was given Solu-Medrol, epinephrine, Benadryl and famotidine.  Her symptoms began to improve in the emergency room.  She denies any current dyspnea or chest pain.  She states that she feels that her rash is improving.  She also feels that her tongue is less swollen.  This history is obtained from prior documentation and with translation from family.    Allergies   Allergen Reactions    Melatonin Hives       Prior to Admission medications    Medication Sig Start Date End Date Taking? Authorizing Provider   EPINEPHrine (EPIPEN 2-JAILENE) 0.3 MG/0.3ML SOAJ injection Inject 0.3 mLs into the muscle once for 1 dose Use as directed for allergic reaction 3/26/24 3/26/24 Yes Raven Murcia MD   levothyroxine (SYNTHROID) 100 MCG tablet Take 1 tablet by mouth every morning (before breakfast) 19   Automatic Reconciliation, Ar       No past medical history on file.     No past surgical history on file.    Social History

## 2024-03-26 NOTE — CARE COORDINATION
As pt has a low readmission risk score ,a full initial admission assessment will not be completed.    I will appreciate any orders from attending specific to discharge needs.    Date/Time of Evaluation: 3/26/2024 1:53 PM  Assessment Completed by: HERMES BRANCH RN    Patient Name: Diane Lacey                   YOB: 1980  Diagnosis: Anaphylaxis, initial encounter [T78.2XXA]  Angioedema, initial encounter [T78.3XXA]                   Date / Time: 3/26/2024  5:37 AM    Patient Admission Status: Inpatient   Readmission Risk (Low < 19, Mod (19-27), High > 27): low risk per discussion with pt.'s nurse  Current PCP: Brigette Perez MD        Hospitalization in the last 30 days (Readmission): no  IAdvance Directives:      Code Status: Full Code   Patient's Primary Decision Maker is:  emergency contact is Blanca Mclaughlin @ [phone]    Financial    Payor: / I have referred pt to [facility] [employer] for financial assistance and referred to [employer] for medicaid referrl      Potential assistance Purchasing Medications:  no  Meds-to-Beds request:  no      The Plan for Transition of Care is related to the following treatment goals of Anaphylaxis, initial encounter [T78.2XXA]  Angioedema, initial encounter [T78.3XXA]      HERMES BRANCH RN  Case Management Department  Ph: [phone]

## 2024-03-26 NOTE — ED NOTES
Change of shift. Care of patient taken over from Dr. Murcia; H&P reviewed, handoff complete.    Perfect Serve Consult for Admission  8:00 AM    ED Room Number: ER06/06  Patient Name and age:  Diane Lacey 43 y.o.  female  Working Diagnosis:   1. Anaphylaxis, initial encounter        COVID-19 Suspicion: No  Sepsis present:  No  Reassessment needed: No  Code Status:  Full Code  Readmission: No  Isolation Requirements: no  Recommended Level of Care: telemetry  Department: [place] [facility] - [phone]  Consulting Provider: Dr. Cruz    Other:  43-year-old female needs admission for anaphylaxis.  Patient had exposure to melatonin last evening and developed anaphylaxis, was seen at [facility] emergency department received intramuscular injection at that time underwent it was.  Was discharged about an hour later came back with itchiness and feeling that her throat was swelling nausea and rash.  Came to the ED here and received epinephrine, Solu-Medrol, Benadryl and famotidine, improved initially but still continues to have pruritus now after observation, no longer having shortness of breath or throat swelling.  Patient feels unsafe going home, given that she had to have 2 injections of epinephrine I believe this warrants admission for observation until resolution of her symptoms.  If patient develops or further dyspnea or respiratory symptoms will give another injection of epinephrine.     Pa Pierson DO  03/26/24 0800

## 2024-03-26 NOTE — ED NOTES
Bedside and Verbal shift change report given to RAEGAN Smith (oncoming nurse) by RAEGAN Gonzalez (offgoing nurse). Report included the following information Nurse Handoff Report, MAR, Quality Measures, and Neuro Assessment.

## 2024-03-26 NOTE — ED NOTES
TRANSFER - OUT REPORT:    Verbal report given to Rebekah CARLIN on Diane Lacey  being transferred to ICU for routine progression of patient care       Report consisted of patient's Situation, Background, Assessment and   Recommendations(SBAR).     Information from the following report(s) Index, MAR, Recent Results, and Neuro Assessment was reviewed with the receiving nurse.    [place] Assessment:                           Lines:   Peripheral IV 03/26/24 Left Antecubital (Active)        Opportunity for questions and clarification was provided.      Patient transported with:  Registered Nurse, Monitor

## 2024-03-26 NOTE — ED TRIAGE NOTES
Pt presents to the ED with hives, pt took melatonin gummies before bed. Woke up around 1am with hives and feeling itchy. Pt states her throat feels tight  Was seen at [facility] ER and discharged   Pt denies abd pain, nausea or vomiting

## 2024-03-26 NOTE — DISCHARGE INSTRUCTIONS
above medications    What to do at Home    Recommended diet:  regular diet    Recommended activity: activity as tolerated    1) Return to the hospital if you feel worse    2) If you experience any of the following symptoms then please call your primary care physician or return to the emergency room if you cannot get hold of your doctor:  Fever, chills, nausea, vomiting, diarrhea, change in mentation, falling, bleeding, shortness of breath, chest pain, severe headache, severe abdominal pain.    Follow Up:  [facility] EMERGENCY DEPT  [address] [facility] [address] [phone]  As needed, If symptoms worsen    Brigette Perez MD  [address] [facility] [address] [phone]  Schedule an appointment as soon as possible for a visit in 1 week(s)  Hospital Follow Up    [facility]  [address] [facility] [address] [phone]  Schedule an appointment as soon as possible for a visit       Usted ha sido evaluado en el Departamento de Emergencias hoy por zoran reacción alérgica. Le adams administrado medicamentos para controlar moises síntomas. Ha estado en observación nandini varias horas en el Departamento de Emergencias y se encuentra estable para el annmarie en tru momento. Puede darius Benadryl y Pepcid, que están disponibles sin receta, para ayudar a controlar moises síntomas en casa.    Puede darius el esteroide (Medrol) que le recetaron en [place] según lo prescrito.    Programe zoran shonda con ley médico de atención primaria para seguimiento.    Regrese al Departamento de Emergencias si experimenta erupciones cutáneas, dificultad para respirar o tragar, hinchazón de labios/boca/lengua, vómitos o cualquier otro síntoma preocupante.    [place]ona por elegirnos para ley atención.    Information obtained by :  I understand that if any problems occur once I am at home I am to contact my physician.    I understand and acknowledge receipt of the instructions indicated above.

## 2024-03-27 ENCOUNTER — APPOINTMENT (OUTPATIENT)
Facility: HOSPITAL | Age: 44
DRG: 916 | End: 2024-03-27

## 2024-03-27 VITALS
TEMPERATURE: 98 F | RESPIRATION RATE: 16 BRPM | SYSTOLIC BLOOD PRESSURE: 118 MMHG | OXYGEN SATURATION: 96 % | DIASTOLIC BLOOD PRESSURE: 69 MMHG | HEIGHT: 61 IN | WEIGHT: 153 LBS | HEART RATE: 81 BPM | BODY MASS INDEX: 28.89 KG/M2

## 2024-03-27 LAB
ANION GAP SERPL CALC-SCNC: 3 MMOL/L (ref 5–15)
APPEARANCE UR: CLEAR
BACTERIA URNS QL MICRO: NEGATIVE /HPF
BASOPHILS # BLD: 0 K/UL (ref 0–0.1)
BASOPHILS NFR BLD: 0 % (ref 0–1)
BILIRUB UR QL: NEGATIVE
BUN SERPL-MCNC: 16 MG/DL (ref 6–20)
BUN/CREAT SERPL: 21 (ref 12–20)
CALCIUM SERPL-MCNC: 8.7 MG/DL (ref 8.5–10.1)
CHLORIDE SERPL-SCNC: 109 MMOL/L (ref 97–108)
CO2 SERPL-SCNC: 28 MMOL/L (ref 21–32)
COLOR UR: NORMAL
COMMENT:: NORMAL
CREAT SERPL-MCNC: 0.75 MG/DL (ref 0.55–1.02)
DIFFERENTIAL METHOD BLD: ABNORMAL
EOSINOPHIL # BLD: 0 K/UL (ref 0–0.4)
EOSINOPHIL NFR BLD: 0 % (ref 0–7)
EPITH CASTS URNS QL MICRO: NORMAL /LPF
ERYTHROCYTE [DISTWIDTH] IN BLOOD BY AUTOMATED COUNT: 12.8 % (ref 11.5–14.5)
GLUCOSE SERPL-MCNC: 141 MG/DL (ref 65–100)
GLUCOSE UR STRIP.AUTO-MCNC: NEGATIVE MG/DL
HCT VFR BLD AUTO: 35 % (ref 35–47)
HGB BLD-MCNC: 11.8 G/DL (ref 11.5–16)
HGB UR QL STRIP: NEGATIVE
HYALINE CASTS URNS QL MICRO: NORMAL /LPF (ref 0–2)
IMM GRANULOCYTES # BLD AUTO: 0.1 K/UL (ref 0–0.04)
IMM GRANULOCYTES NFR BLD AUTO: 0 % (ref 0–0.5)
KETONES UR QL STRIP.AUTO: NEGATIVE MG/DL
LEUKOCYTE ESTERASE UR QL STRIP.AUTO: NEGATIVE
LYMPHOCYTES # BLD: 1.5 K/UL (ref 0.8–3.5)
LYMPHOCYTES NFR BLD: 9 % (ref 12–49)
MCH RBC QN AUTO: 30.2 PG (ref 26–34)
MCHC RBC AUTO-ENTMCNC: 33.7 G/DL (ref 30–36.5)
MCV RBC AUTO: 89.5 FL (ref 80–99)
MONOCYTES # BLD: 0.7 K/UL (ref 0–1)
MONOCYTES NFR BLD: 5 % (ref 5–13)
NEUTS SEG # BLD: 13.7 K/UL (ref 1.8–8)
NEUTS SEG NFR BLD: 86 % (ref 32–75)
NITRITE UR QL STRIP.AUTO: NEGATIVE
NRBC # BLD: 0 K/UL (ref 0–0.01)
NRBC BLD-RTO: 0 PER 100 WBC
PH UR STRIP: 6.5 (ref 5–8)
PLATELET # BLD AUTO: 240 K/UL (ref 150–400)
PMV BLD AUTO: 10.9 FL (ref 8.9–12.9)
POTASSIUM SERPL-SCNC: 3.9 MMOL/L (ref 3.5–5.1)
PROT UR STRIP-MCNC: NEGATIVE MG/DL
RBC # BLD AUTO: 3.91 M/UL (ref 3.8–5.2)
RBC #/AREA URNS HPF: NORMAL /HPF (ref 0–5)
SODIUM SERPL-SCNC: 140 MMOL/L (ref 136–145)
SP GR UR REFRACTOMETRY: 1.01 (ref 1–1.03)
SPECIMEN HOLD: NORMAL
URINE CULTURE IF INDICATED: NORMAL
UROBILINOGEN UR QL STRIP.AUTO: 1 EU/DL (ref 0.2–1)
WBC # BLD AUTO: 16 K/UL (ref 3.6–11)
WBC URNS QL MICRO: NORMAL /HPF (ref 0–4)

## 2024-03-27 PROCEDURE — 81001 URINALYSIS AUTO W/SCOPE: CPT

## 2024-03-27 PROCEDURE — 80048 BASIC METABOLIC PNL TOTAL CA: CPT

## 2024-03-27 PROCEDURE — 2580000003 HC RX 258: Performed by: INTERNAL MEDICINE

## 2024-03-27 PROCEDURE — 94761 N-INVAS EAR/PLS OXIMETRY MLT: CPT

## 2024-03-27 PROCEDURE — 2500000003 HC RX 250 WO HCPCS: Performed by: INTERNAL MEDICINE

## 2024-03-27 PROCEDURE — 85025 COMPLETE CBC W/AUTO DIFF WBC: CPT

## 2024-03-27 PROCEDURE — A4216 STERILE WATER/SALINE, 10 ML: HCPCS | Performed by: INTERNAL MEDICINE

## 2024-03-27 PROCEDURE — 6360000002 HC RX W HCPCS: Performed by: INTERNAL MEDICINE

## 2024-03-27 PROCEDURE — 71045 X-RAY EXAM CHEST 1 VIEW: CPT

## 2024-03-27 RX ORDER — METHYLPREDNISOLONE 4 MG/1
TABLET ORAL
Qty: 1 KIT | Refills: 0 | Status: SHIPPED | OUTPATIENT
Start: 2024-03-27 | End: 2024-04-02

## 2024-03-27 RX ADMIN — ENOXAPARIN SODIUM 40 MG: 100 INJECTION SUBCUTANEOUS at 09:39

## 2024-03-27 RX ADMIN — METHYLPREDNISOLONE SODIUM SUCCINATE 40 MG: 40 INJECTION INTRAMUSCULAR; INTRAVENOUS at 13:18

## 2024-03-27 RX ADMIN — SODIUM CHLORIDE, PRESERVATIVE FREE 10 ML: 5 INJECTION INTRAVENOUS at 09:40

## 2024-03-27 RX ADMIN — FAMOTIDINE 20 MG: 10 INJECTION, SOLUTION INTRAVENOUS at 09:39

## 2024-03-27 RX ADMIN — DIPHENHYDRAMINE HYDROCHLORIDE 25 MG: 50 INJECTION, SOLUTION INTRAMUSCULAR; INTRAVENOUS at 04:02

## 2024-03-27 RX ADMIN — METHYLPREDNISOLONE SODIUM SUCCINATE 40 MG: 40 INJECTION INTRAMUSCULAR; INTRAVENOUS at 03:31

## 2024-03-27 ASSESSMENT — PAIN SCALES - GENERAL
PAINLEVEL_OUTOF10: 0

## 2024-03-27 NOTE — CARE COORDINATION
Discharge:  Pt has a low RUR score of 3%  There are no identified home health,discharge or DME needs identified.    Rebekah Puentes RN

## 2024-03-27 NOTE — DISCHARGE SUMMARY
[facility]  [address] [place] [address] [phone]          Hospitalist Discharge Summary     Patient ID:  Diane Lacey  [id number]  43 y.o.  1980    Admit date: 3/26/2024    Discharge date and time: 3/27/2024 1:01 PM    Admission Diagnoses: Anaphylaxis, initial encounter [T78.2XXA]  Angioedema, initial encounter [T78.3XXA]    Discharge Diagnoses:  Principal Diagnosis Angioedema, initial encounter                                            Principal Problem (Resolved):    Angioedema, initial encounter  Active Problems:    Anaphylactic reaction         Hospital Course:     Anaphylactic reaction POA: Suspect due to melatonin patient has never taken before.  Status post epinephrine injection in the emergency room.  Protecting airway with no O2 needs.  Sp IV steroids, IV Benadryl and famotidine; medrol dose pack on discharge. Epi pen on discharge. Tolerating regular diet. Intensivist evaluated. Fu OP with allergy specialist      Diffuse rash POA: Resolved. Due to above.  Continue treatment as above.     Leucocytosis/ Body Aches:  RVP neg. CXR no acute concerns. UA w/ no signs of infection. Fu PCP OP      Hypothyroidism: TSH pending. Continue home synthroid. FU OP      Hyperlipidemia POA: not on home meds.        PCP: Brigette Perez MD     Consults: pulmonary/intensive care    Significant Diagnostic Studies:     CXR   IMPRESSION:  No acute cardiopulmonary process.    Discharge Exam:  Physical Exam:    Gen:  Well-developed, well-nourished, in no acute distress  HEENT:  Pink conjunctivae, PERRL, hearing intact to voice  Resp:  No accessory muscle use, clear breath sounds without wheezes rales or rhonchi  Card:  RRR, No murmurs, normal S1, S2, no peripheral edema  Abd:  Soft, non-tender, non-distended, normoactive bowel sounds are present  Musc:  No cyanosis or clubbing  Skin: Facial rash  Neuro:  Cranial nerves 3-12 are grossly intact, follows commands

## 2024-03-27 NOTE — PROGRESS NOTES
Chart accessed for report from RAEGAN Salas from ED to ICU  
I was initially asked to evaluate the patient with potential angioedema.     I evaluated patient in the emergency room.  History was obtained from patient's family at bedside, since patient is primarily Telugu speaking.  Patient reporting feeling well at the time of my evaluation.  She reported coming to the emergency room with diffuse rash this morning.  Also endorsed shortness of breath, and a feeling of throat swelling last night after taking melatonin.    Per family, patient has been feeling unwell since yesterday.  She was having diffuse bodyaches and took Advil yesterday afternoon.  Later in the evening, she took melatonin 2.5 mg to help her sleep.  This was the first time she took this brand of melatonin.  She developed symptoms of throat swelling, nausea and initially went to [facility] emergency room.  Received intramuscular injection with improvement.  Returned to [facility] emergency room due to recurrence of symptoms and diffuse rash.  She was treated with Solu-Medrol, epinephrine, Benadryl and famotidine with improvement in symptoms.    At the time of my evaluation, patient's rash has disappeared.  She feels mild irritation in the back of her throat.  She denies any significant tongue swelling or throat swelling.  She does not have any significant wheezing.  She denies any shortness of breath.  She does not have any significant respiratory distress.    Impression:  -Most likely delayed phase of anaphylactic reaction from reaction to melatonin.  -Angioedema following NSAID use can be a concern, however, patient has used NSAIDs in the past without any issues.  Additionally, presence of diffuse rash and improvement with epinephrine would favor anaphylaxis.  -Patient is being admitted to hospitalist service for observation.  -Given reassuring examination, she has a patent airway and has good response to medications.  No strong indication for ICU admission.  Please alert ICU team in 
daughter were thankful for the visitation and the prayers offered. Please contact spiritual health services for further assistance needed.     Erik Domínguez MDiv  Staff    [facility]  Paging Service [phone] ([place])

## 2024-04-02 ENCOUNTER — OFFICE VISIT (OUTPATIENT)
Age: 44
End: 2024-04-02

## 2024-04-02 ENCOUNTER — HOSPITAL ENCOUNTER (OUTPATIENT)
Facility: HOSPITAL | Age: 44
Discharge: HOME | End: 2024-04-05
Attending: FAMILY MEDICINE

## 2024-04-02 DIAGNOSIS — Z12.31 VISIT FOR SCREENING MAMMOGRAM: ICD-10-CM

## 2024-04-02 DIAGNOSIS — Z12.31 BREAST CANCER SCREENING BY MAMMOGRAM: Primary | ICD-10-CM

## 2024-04-02 PROCEDURE — 77063 BREAST TOMOSYNTHESIS BI: CPT

## 2024-04-02 NOTE — PROGRESS NOTES
EVERY WOMANS LIFE HISTORY QUESTIONNAIRE       No Yes Comments   Has a doctor ever seen or felt anything wrong with your breast? [x]                                  []                                     Have you ever had a breast biopsy? [x]                                  []                                          When and where was last mammogram performed?  At [facility] 5/12/2023.     Have you ever been told that there was a problem on your mammogram?   No Yes Comments   [x]                                  []                                       Do you have breast implants?   No Yes Comments   [x]                                  []                                       When was your last Pap test [facility]? [facility] and she says this was done only because provider did not believe she had hysterectomy . Declined [facility] pap service today.     Have you ever had an abnormal Pap test?   No Yes Comments   [x]                                  []                                       Have you had a hysterectomy?   No Yes Comments (why)   []                                  [x]                                  12 years ago, she had a cyst on one ovary and something in her uterus but was not cancer.      Have you been through menopause?   No Yes Date of LMP   [x]                                  []                                       Did your mother take AKIRA?  No Yes Unknown   []                                  []                                  x     Do you have a history of HIV exposure?  No Yes    [x]                                  []                                       Have you ever been diagnosed with any type of Cancer   No Yes Comments (type,when,where,type of treatment   [x]                                  []                                          Has a family member been diagnosed with breast or ovarian cancer?   No Yes Comments (which family

## 2024-04-02 NOTE — PROGRESS NOTES
Diane Lacey is a 43 y.o. female   Chief Complaint   Patient presents with    Annual Exam     [facility]           ASSESSMENT AND PLAN:    1. Breast cancer screening by mammogram  CBE WNL today; proceed with screening mammogram.  Return annually for screening unless otherwise indicated.    SUBJECTIVE:    HPI:  Diane Lacey is a 43 y.o. female -  s/p hysterectomy) who presents to [facility] for breast cancer screening. She denies lumps, nipple discharge, nipple inversion and overlying skin changes. She has had intermittent right breast pain and burning for several months.  No family history of breast, ovarian or colon cancer.  PT reports last mammogram in May 2023 was normal.  Recent overnight admission to ICU after anaphylactic reaction.    Review of Systems   Constitutional: Negative.    Respiratory: Negative.     Cardiovascular: Negative.    Gastrointestinal: Negative.    Genitourinary:  Negative for dyspareunia, dysuria, genital sores, pelvic pain, vaginal discharge and vaginal pain.     Physical Exam  Constitutional:       General: She is not in acute distress.     Appearance: Normal appearance.   Pulmonary:      Effort: Pulmonary effort is normal.   Chest:   Breasts:     Right: Normal. No inverted nipple, mass, nipple discharge, skin change. Mild TTP outer quadrants.     Left: Normal. No inverted nipple, mass, nipple discharge, skin change. Mild TTP lower outer.  Lymphadenopathy:      Upper Body:      Right upper body: No supraclavicular, axillary or pectoral adenopathy.      Left upper body: No supraclavicular, axillary or pectoral adenopathy.   Neurological:      Mental Status: She is alert and oriented to person, place, and time.

## 2024-04-30 ENCOUNTER — HOSPITAL ENCOUNTER (OUTPATIENT)
Facility: HOSPITAL | Age: 44
Setting detail: SPECIMEN
Discharge: HOME | End: 2024-05-03

## 2024-04-30 LAB
ALBUMIN SERPL-MCNC: 3.8 G/DL (ref 3.5–5)
ALBUMIN/GLOB SERPL: 1.1 (ref 1.1–2.2)
ALP SERPL-CCNC: 78 U/L (ref 45–117)
ALT SERPL-CCNC: 21 U/L (ref 12–78)
ANION GAP SERPL CALC-SCNC: 5 MMOL/L (ref 5–15)
AST SERPL-CCNC: 12 U/L (ref 15–37)
BILIRUB SERPL-MCNC: 0.3 MG/DL (ref 0.2–1)
BUN SERPL-MCNC: 13 MG/DL (ref 6–20)
BUN/CREAT SERPL: 21 (ref 12–20)
CALCIUM SERPL-MCNC: 9.4 MG/DL (ref 8.5–10.1)
CHLORIDE SERPL-SCNC: 105 MMOL/L (ref 97–108)
CHOLEST SERPL-MCNC: 199 MG/DL
CO2 SERPL-SCNC: 28 MMOL/L (ref 21–32)
CREAT SERPL-MCNC: 0.62 MG/DL (ref 0.55–1.02)
EST. AVERAGE GLUCOSE BLD GHB EST-MCNC: 111 MG/DL
GLOBULIN SER CALC-MCNC: 3.5 G/DL (ref 2–4)
GLUCOSE SERPL-MCNC: 95 MG/DL (ref 65–100)
HBA1C MFR BLD: 5.5 % (ref 4–5.6)
HDLC SERPL-MCNC: 47 MG/DL
HDLC SERPL: 4.2 (ref 0–5)
LDLC SERPL CALC-MCNC: 118.4 MG/DL (ref 0–100)
POTASSIUM SERPL-SCNC: 4.3 MMOL/L (ref 3.5–5.1)
PROT SERPL-MCNC: 7.3 G/DL (ref 6.4–8.2)
SODIUM SERPL-SCNC: 138 MMOL/L (ref 136–145)
TRIGL SERPL-MCNC: 168 MG/DL
TSH SERPL DL<=0.05 MIU/L-ACNC: 3.07 UIU/ML (ref 0.36–3.74)
VLDLC SERPL CALC-MCNC: 33.6 MG/DL

## 2024-04-30 PROCEDURE — 80061 LIPID PANEL: CPT

## 2024-04-30 PROCEDURE — 83036 HEMOGLOBIN GLYCOSYLATED A1C: CPT

## 2024-04-30 PROCEDURE — 36415 COLL VENOUS BLD VENIPUNCTURE: CPT

## 2024-04-30 PROCEDURE — 80053 COMPREHEN METABOLIC PANEL: CPT

## 2024-04-30 PROCEDURE — 84443 ASSAY THYROID STIM HORMONE: CPT

## 2024-10-08 ENCOUNTER — HOSPITAL ENCOUNTER (OUTPATIENT)
Facility: HOSPITAL | Age: 44
Setting detail: SPECIMEN
Discharge: HOME | End: 2024-10-11

## 2024-10-08 LAB — TSH SERPL DL<=0.05 MIU/L-ACNC: 5.89 UIU/ML (ref 0.36–3.74)

## 2024-10-08 PROCEDURE — 84443 ASSAY THYROID STIM HORMONE: CPT

## 2024-12-02 ENCOUNTER — HOSPITAL ENCOUNTER (OUTPATIENT)
Facility: HOSPITAL | Age: 44
Setting detail: SPECIMEN
Discharge: HOME | End: 2024-12-05

## 2024-12-02 PROCEDURE — 84443 ASSAY THYROID STIM HORMONE: CPT

## 2024-12-03 LAB — TSH SERPL DL<=0.05 MIU/L-ACNC: 0.35 UIU/ML (ref 0.36–3.74)

## 2025-02-05 ENCOUNTER — HOSPITAL ENCOUNTER (OUTPATIENT)
Facility: HOSPITAL | Age: 45
Setting detail: SPECIMEN
Discharge: HOME | End: 2025-02-08

## 2025-02-05 PROCEDURE — 84443 ASSAY THYROID STIM HORMONE: CPT

## 2025-02-06 LAB — TSH SERPL DL<=0.05 MIU/L-ACNC: 5.39 UIU/ML (ref 0.36–3.74)

## 2025-04-09 ENCOUNTER — HOSPITAL ENCOUNTER (EMERGENCY)
Facility: HOSPITAL | Age: 45
Discharge: HOME | End: 2025-04-09
Attending: EMERGENCY MEDICINE

## 2025-04-09 VITALS
HEART RATE: 85 BPM | DIASTOLIC BLOOD PRESSURE: 84 MMHG | RESPIRATION RATE: 16 BRPM | BODY MASS INDEX: 31.76 KG/M2 | HEIGHT: 61 IN | SYSTOLIC BLOOD PRESSURE: 122 MMHG | OXYGEN SATURATION: 100 % | WEIGHT: 168.21 LBS | TEMPERATURE: 99.3 F

## 2025-04-09 DIAGNOSIS — K04.7 INFECTED DENTAL CARIES: Primary | ICD-10-CM

## 2025-04-09 DIAGNOSIS — K02.9 INFECTED DENTAL CARIES: Primary | ICD-10-CM

## 2025-04-09 PROCEDURE — 99283 EMERGENCY DEPT VISIT LOW MDM: CPT

## 2025-04-09 PROCEDURE — 6370000000 HC RX 637 (ALT 250 FOR IP): Performed by: EMERGENCY MEDICINE

## 2025-04-09 RX ORDER — NAPROXEN 500 MG/1
500 TABLET ORAL 2 TIMES DAILY WITH MEALS
Qty: 60 TABLET | Refills: 0 | Status: SHIPPED | OUTPATIENT
Start: 2025-04-09

## 2025-04-09 RX ORDER — AMOXICILLIN 875 MG/1
875 TABLET, COATED ORAL 2 TIMES DAILY
Qty: 20 TABLET | Refills: 0 | Status: SHIPPED | OUTPATIENT
Start: 2025-04-09 | End: 2025-04-19

## 2025-04-09 RX ORDER — AMOXICILLIN 250 MG/1
500 CAPSULE ORAL ONCE
Status: COMPLETED | OUTPATIENT
Start: 2025-04-09 | End: 2025-04-09

## 2025-04-09 RX ADMIN — AMOXICILLIN 500 MG: 250 CAPSULE ORAL at 01:50

## 2025-04-09 RX ADMIN — DIPHENHYDRAMINE HYDROCHLORIDE: 12.5 SOLUTION ORAL at 01:51

## 2025-04-09 ASSESSMENT — PAIN DESCRIPTION - DESCRIPTORS: DESCRIPTORS: ACHING

## 2025-04-09 ASSESSMENT — PAIN DESCRIPTION - LOCATION: LOCATION: TEETH

## 2025-04-09 ASSESSMENT — PAIN SCALES - GENERAL: PAINLEVEL_OUTOF10: 8

## 2025-04-09 ASSESSMENT — PAIN - FUNCTIONAL ASSESSMENT: PAIN_FUNCTIONAL_ASSESSMENT: 0-10

## 2025-04-09 ASSESSMENT — PAIN DESCRIPTION - ORIENTATION: ORIENTATION: LEFT

## 2025-04-09 NOTE — ED PROVIDER NOTES
available results have been reviewed with pt and any available family. Pt understands sx, dx, and tx in ED. Care plan has been outlined and questions have been answered. Pt is ready to go home. Will send home on infected dental caries instruction.  Prescription for naproxen and Amoxil.. Outpatient referral with PCP/[facility] as needed. Written by Pa Leong MD,8:59 AM       CONSULTS:  None    PROCEDURES:  Unless otherwise noted below, none     Procedures      FINAL IMPRESSION      1. Infected dental caries          DISPOSITION/PLAN   DISPOSITION Decision To Discharge 04/09/2025 01:44:09 AM      PATIENT REFERRED TO:  [facility]  [address] [place] [address]  [phone]  Schedule an appointment as soon as possible for a visit in 3 days  for reevaluation and further treatment as needed      DISCHARGE MEDICATIONS:  Discharge Medication List as of 4/9/2025  1:44 AM        START taking these medications    Details   amoxicillin (AMOXIL) 875 MG tablet Take 1 tablet by mouth 2 times daily for 10 days, Disp-20 tablet, R-0Normal      naproxen (NAPROSYN) 500 MG tablet Take 1 tablet by mouth 2 times daily (with meals), Disp-60 tablet, R-0Normal               (Please note that portions of this note were completed with a voice recognition program.  Efforts were made to edit the dictations but occasionally words are mis-transcribed.)    Pa Leong MD (electronically signed)  Emergency Attending Physician / Physician Assistant / Nurse Practitioner            Pa Leong MD  04/09/25 0901

## 2025-04-09 NOTE — DISCHARGE INSTRUCTIONS
Emergency Dental Care     [facility]   [address] [place] [address]   Monday, Wednesday, Friday: 8am-5pm  Tuesday and Thursday: 8am-6pm  Phone: [phone]  $70 for Emergency Care  $60 for first routine care, then pay by sliding scale based upon income      [address] [address]   Phone: [phone], select option (2) to confirm time for treatment     [facility]  [address] [place] [address]   Monday-Friday: 8am-4pm  Phone: [phone]     [facility]  [facility], [facility], [facility] [address]  Phone: [phone] to confirm a time for emergency treatment  Pediatrics: [phone]  $75 per tooth, extractions only     [facility]  [address] [facility] [address]   Phone [phone] or [phone]  Hours [id number] (extractions)  Simple tooth extraction: $60 per tooth, $55 per x-ray     [facility] (in [place])  [facility] Residents only  Phone: [phone], leave message saying you need an appointment to register  Hours: Wed 6-9p       Non-Urgent Dental Care Clinics    [facility]  [address] [place] [address]  Phone: [phone]     [facility]  [address] [place] [address]   Dental Clinic: [phone]  Oral Surgery Clinic: [phone]

## 2025-04-09 NOTE — ED TRIAGE NOTES
Pt presents to the ED with c/o dental pain. Pt had a tooth on the left side taken out on Friday, reports severe pain since then. States there was a piece of tooth that came out from site when she was brushing this morning   Endorses fever as well , has been taking tylenol and amoxicillin

## 2025-06-10 ENCOUNTER — HOSPITAL ENCOUNTER (OUTPATIENT)
Facility: HOSPITAL | Age: 45
Setting detail: SPECIMEN
Discharge: HOME | End: 2025-06-13

## 2025-06-10 LAB
ALBUMIN SERPL-MCNC: 4.1 G/DL (ref 3.5–5)
ALBUMIN/GLOB SERPL: 1.1 (ref 1.1–2.2)
ALP SERPL-CCNC: 81 U/L (ref 45–117)
ALT SERPL-CCNC: 25 U/L (ref 12–78)
ANION GAP SERPL CALC-SCNC: 7 MMOL/L (ref 2–12)
AST SERPL-CCNC: 18 U/L (ref 15–37)
BILIRUB SERPL-MCNC: 0.4 MG/DL (ref 0.2–1)
BUN SERPL-MCNC: 14 MG/DL (ref 6–20)
BUN/CREAT SERPL: 20 (ref 12–20)
CALCIUM SERPL-MCNC: 9.3 MG/DL (ref 8.5–10.1)
CHLORIDE SERPL-SCNC: 103 MMOL/L (ref 97–108)
CHOLEST SERPL-MCNC: 203 MG/DL
CO2 SERPL-SCNC: 26 MMOL/L (ref 21–32)
CREAT SERPL-MCNC: 0.7 MG/DL (ref 0.55–1.02)
EST. AVERAGE GLUCOSE BLD GHB EST-MCNC: 105 MG/DL
GLOBULIN SER CALC-MCNC: 3.7 G/DL (ref 2–4)
GLUCOSE SERPL-MCNC: 96 MG/DL (ref 65–100)
HBA1C MFR BLD: 5.3 % (ref 4–5.6)
HDLC SERPL-MCNC: 49 MG/DL
HDLC SERPL: 4.1 (ref 0–5)
LDLC SERPL CALC-MCNC: 124.8 MG/DL (ref 0–100)
POTASSIUM SERPL-SCNC: 4.2 MMOL/L (ref 3.5–5.1)
PROT SERPL-MCNC: 7.8 G/DL (ref 6.4–8.2)
SODIUM SERPL-SCNC: 136 MMOL/L (ref 136–145)
TRIGL SERPL-MCNC: 146 MG/DL
TSH SERPL DL<=0.05 MIU/L-ACNC: 2.19 UIU/ML (ref 0.36–3.74)
VLDLC SERPL CALC-MCNC: 29.2 MG/DL

## 2025-06-10 PROCEDURE — 80061 LIPID PANEL: CPT

## 2025-06-10 PROCEDURE — 84443 ASSAY THYROID STIM HORMONE: CPT

## 2025-06-10 PROCEDURE — 80053 COMPREHEN METABOLIC PANEL: CPT

## 2025-06-10 PROCEDURE — 83036 HEMOGLOBIN GLYCOSYLATED A1C: CPT
